# Patient Record
Sex: MALE | Race: WHITE | HISPANIC OR LATINO | Employment: FULL TIME | URBAN - METROPOLITAN AREA
[De-identification: names, ages, dates, MRNs, and addresses within clinical notes are randomized per-mention and may not be internally consistent; named-entity substitution may affect disease eponyms.]

---

## 2018-05-04 ENCOUNTER — OFFICE VISIT (OUTPATIENT)
Dept: OCCUPATIONAL MEDICINE | Facility: CLINIC | Age: 33
End: 2018-05-04
Payer: OTHER MISCELLANEOUS

## 2018-05-04 VITALS
WEIGHT: 162 LBS | OXYGEN SATURATION: 98 % | SYSTOLIC BLOOD PRESSURE: 144 MMHG | RESPIRATION RATE: 16 BRPM | HEIGHT: 64 IN | TEMPERATURE: 97 F | DIASTOLIC BLOOD PRESSURE: 85 MMHG | HEART RATE: 70 BPM | BODY MASS INDEX: 27.66 KG/M2

## 2018-05-04 DIAGNOSIS — S61.012A LACERATION OF SKIN OF LEFT THUMB, INITIAL ENCOUNTER: Primary | ICD-10-CM

## 2018-05-04 DIAGNOSIS — R52 PAIN: ICD-10-CM

## 2018-05-04 PROCEDURE — 96372 THER/PROPH/DIAG INJ SC/IM: CPT | Mod: 59,S$GLB,, | Performed by: INTERNAL MEDICINE

## 2018-05-04 PROCEDURE — 90714 TD VACC NO PRESV 7 YRS+ IM: CPT | Mod: S$GLB,,, | Performed by: INTERNAL MEDICINE

## 2018-05-04 PROCEDURE — 90471 IMMUNIZATION ADMIN: CPT | Mod: S$GLB,,, | Performed by: INTERNAL MEDICINE

## 2018-05-04 PROCEDURE — 99213 OFFICE O/P EST LOW 20 MIN: CPT | Mod: 25,S$GLB,, | Performed by: INTERNAL MEDICINE

## 2018-05-04 RX ORDER — MUPIROCIN 20 MG/G
OINTMENT TOPICAL 3 TIMES DAILY
Qty: 15 G | Refills: 0 | Status: SHIPPED | OUTPATIENT
Start: 2018-05-04

## 2018-05-04 RX ORDER — ETODOLAC 400 MG/1
400 TABLET, FILM COATED ORAL 3 TIMES DAILY
Qty: 15 TABLET | Refills: 0 | Status: SHIPPED | OUTPATIENT
Start: 2018-05-04 | End: 2018-05-17 | Stop reason: SDUPTHER

## 2018-05-04 RX ORDER — KETOROLAC TROMETHAMINE 30 MG/ML
30 INJECTION, SOLUTION INTRAMUSCULAR; INTRAVENOUS
Status: COMPLETED | OUTPATIENT
Start: 2018-05-04 | End: 2018-05-04

## 2018-05-04 RX ORDER — KETOROLAC TROMETHAMINE 30 MG/ML
30 INJECTION, SOLUTION INTRAMUSCULAR; INTRAVENOUS ONCE
Qty: 1 ML | Refills: 0
Start: 2018-05-04 | End: 2018-05-04 | Stop reason: CLARIF

## 2018-05-04 RX ADMIN — KETOROLAC TROMETHAMINE 30 MG: 30 INJECTION, SOLUTION INTRAMUSCULAR; INTRAVENOUS at 11:05

## 2018-05-04 NOTE — PATIENT INSTRUCTIONS
Small or Superficial Laceration: Not Sutured  A laceration is a cut through the skin. A laceration requires stitches or staples if it is deep or spread open. A small laceration often doesn't require stitches.   You may need a tetanus shot. This may be given if you have no record of this vaccination and the object that caused the cut may lead to tetanus  Home care  · Your healthcare provider may prescribe an antibiotic. This is to help prevent infection. Follow all instructions for taking this medicine. Take the medicine every day until it is gone or you are told to stop. You should not have any left over.  · The healthcare provider may prescribe medicines for pain. Follow instructions for taking them.  · Follow the healthcare providers instructions on how to care for the cut.  · Wash your hands with soap and warm water before and after caring for cut. This helps prevent infection.  · Keep the wound clean and dry. If a bandage was applied and it becomes wet or dirty, replace it. Otherwise, leave it in place for the first 24 hours, then change it once a day or as directed.  · Clean the wound daily:  ¨ After removing any bandage, wash the area with soap and water. Use a wet cotton swab to loosen and remove any blood or crust that forms.  ¨ After cleaning, keep the wound clean and dry. Talk with your doctor before applying any antibiotic ointment to the wound. Reapply a fresh bandage.  · You may remove the bandage to shower as usual after the first 24 hours, but do not soak the area in water (no tub baths or swimming) for the next 5 days.  · If the area gets wet, gently pat it dry with a clean cloth. Replace the wet bandage with a dry one.  · Avoid activities that may reinjure your wound.  · Do not scratch, rub, or pick at the area.  · Check the wound daily for signs of infection listed below.  Follow-up care  Follow up with your healthcare provider as advised.  When to seek medical advice  Call your healthcare  provider right away if any of these occur:  · Wound bleeding not controlled by direct pressure  · Signs of infection, including increasing pain in the wound, increasing wound redness or swelling, or pus or bad odor coming from the wound  · Fever of 100.4°F (38ºC) or higher or as directed by your healthcare provider  · Stitches or staples come apart or fall out or surgical tape falls off before 7 days  · Wound edges re-open  · Wound changes colors  · Numbness around the wound   · Decreased movement around the injured area  Date Last Reviewed: 6/14/2015  © 6634-4837 [employer]. [address] [place], [place] [id number]. All rights reserved. This information is not intended as a substitute for professional medical care. Always follow your healthcare professional's instructions.

## 2018-05-04 NOTE — PROGRESS NOTES
"Subjective:       Patient ID: Jairo Willis is a 33 y.o. male.    Vitals:  height is 5' 4" (1.626 m) and weight is 73.5 kg (162 lb). His respiration is 16.     Chief Complaint: Laceration    Pt in for left thumb lac. Was cutting sweet potatoes and cut his finger. Happened about an hour ago. Pt tetanus shot is not utd.       Laceration    The incident occurred 1 to 3 hours ago. Possible foreign bodies include metal.   Cut tip of left thumb with knife.  Not a puncture.  Does not recall last tetanus shot.    Review of Systems   Constitution: Negative for chills and fever.   HENT: Negative for sore throat.    Eyes: Negative for blurred vision.   Cardiovascular: Negative for chest pain.   Respiratory: Negative for shortness of breath.    Skin: Negative for rash.   Musculoskeletal: Negative for back pain and joint pain.   Gastrointestinal: Negative for abdominal pain, diarrhea, nausea and vomiting.   Neurological: Negative for headaches.   Psychiatric/Behavioral: The patient is not nervous/anxious.        Objective:      Physical Exam   Constitutional: He appears well-developed and well-nourished.   Skin:   Superficial laceration above distal to nail.  Cleansed with Betadine soak.    Nursing note and vitals reviewed.      Assessment:       1.  Skin laceration left thumb  Plan:         1.  Cleanse with Betadine and apply sterile dressing.  2.  Soap and water cleansing and Bactroban Dressings until healed  3.  Td  "

## 2018-05-09 ENCOUNTER — OFFICE VISIT (OUTPATIENT)
Dept: OCCUPATIONAL MEDICINE | Facility: CLINIC | Age: 33
End: 2018-05-09
Payer: OTHER MISCELLANEOUS

## 2018-05-09 VITALS
RESPIRATION RATE: 16 BRPM | HEIGHT: 63 IN | BODY MASS INDEX: 29.06 KG/M2 | SYSTOLIC BLOOD PRESSURE: 130 MMHG | WEIGHT: 164 LBS | DIASTOLIC BLOOD PRESSURE: 87 MMHG | TEMPERATURE: 98 F | OXYGEN SATURATION: 97 % | HEART RATE: 98 BPM

## 2018-05-09 DIAGNOSIS — S61.219A LACERATION OF FINGER WITH INFECTION, INITIAL ENCOUNTER: Primary | ICD-10-CM

## 2018-05-09 DIAGNOSIS — L08.9 LACERATION OF FINGER WITH INFECTION, INITIAL ENCOUNTER: Primary | ICD-10-CM

## 2018-05-09 PROCEDURE — 99214 OFFICE O/P EST MOD 30 MIN: CPT | Mod: S$GLB,,, | Performed by: NURSE PRACTITIONER

## 2018-05-09 RX ORDER — CEPHALEXIN 250 MG/1
250 CAPSULE ORAL 4 TIMES DAILY
Qty: 40 CAPSULE | Refills: 0 | Status: SHIPPED | OUTPATIENT
Start: 2018-05-09 | End: 2018-05-19

## 2018-05-09 NOTE — LETTER
May 9, 2018      [facility]  [address] [address]  Phone: [phone]  Fax: [phone]       Patient: Jairo Willis   YOB: 1985  Date of Visit: 05/09/2018    To Whom It May Concern:    Choco Willis  was at [facility] on 05/09/2018. He may return to work/school on 5/9/18 with restrictions.   He will need to follow up with Occupational Medicine in the morning for further direction regarding his condition.   If you have any questions or concerns, or if I can be of further assistance, please do not hesitate to contact me.    Sincerely,    Amy Johnson NP

## 2018-05-09 NOTE — PATIENT INSTRUCTIONS
Laceration Repair   If your condition worsens or fails to improve we recommend that you receive another evaluation at the ER immediately or contact your PCP to discuss your concerns or return here. You must understand that you've received an urgent care treatment only and that you may be released before all your medical problems are known or treated. You the patient will arrange for followup care as instructed.   Use the prescription antibiotic ointment for 2-3 days only. Clean the wound twice a day with betadiene and apply the ointment. After that, only use a dry bandage as the ointment will keep the laceration too moist.   Monitor for signs of infection such as redness, drainage, increase swelling or increase pain.   If you were given narcotics for pain do not drive or operate heavy equipment or machinery.   Tylenol or ibuprofen can also be used as directed for pain unless you have an allergy to them or medical condition such as stomach ulcers, kidney or liver disease or blood thinners etc for which you should not be taking these type of medications.     Wound Check, Infection  You have a wound that has become infected. The wound will not heal properly unless the infection is cleared. Infection in a wound may also spread if it is not treated. In most cases, antibiotic medicines are prescribed to treat a wound infection.   Symptoms of a wound infection include:  · Redness or swelling around the wound  · Warmth coming from the wound  · New or worsening pain  · Red streaks around the wound  · Draining pus  · Fever  Home care  Follow all directions you are given to treat the infection.  Medicines  Take all medicines as prescribed.   · If you were given antibiotics, take them until they are gone or your healthcare provider tells you to stop. It is vital to finish the antibiotics even if you feel better. If you do not finish them, the infection may come back and be  harder to treat.  · If your infection is not responding to the medicines you are taking, you may be prescribed new medicines.  · Take medicine for pain as directed by your healthcare provider.  Wound care  Care for your wound as directed by your healthcare provider.  · Apply a warm compress (clean cloth soaked in hot water) to the infected area for about 5 to 10 minutes at a time. Be very careful not to burn yourself. Test the cloth on a non-infected area to make sure it is not too hot.  · Continue to change the dressing daily. If it becomes wet, stained with wound fluid, or dirty, change it sooner. To change it:  ¨ Wash your hands with soap and water before changing the dressing.  ¨ Carefully remove the dressing and tape. If it sticks to the wound, you may need to wet it a little to remove it. (Do not do this if your healthcare provider has told you not to.)  ¨ Gently clean the wound with clean water (or saline) using gauze, a clean washcloth, or cotton swab.  ¨ Do not use soap, alcohol, peroxide or other cleansers.  ¨ If you were told to dry the wound before putting on a new dressing, gently pat. Do not rub.  ¨ Throw out the old dressing.  ¨ Wash your hands again before opening the new, clean dressing.  ¨ Wash your hands again when you are done.  Follow-up care  Follow up with your healthcare provider as advised. If a culture was done, you will be notified if your treatment needs to change. Call as directed for the results.  When to seek medical advice  Call your health care provider right away if any of these occur:  · Symptoms of infection don't start to improve within 2 days of starting antibiotics  · Symptoms of infection get worse  · New symptoms, such as red streaks around the wound  · Fever of 100.4°F (38.0°C) or higher for more than 2 days after starting the antibiotics  Date Last Reviewed: 8/10/2015  © 3170-1366 [employer]. [address] [place] [address], [place] [address]. All rights reserved.  This information is not intended as a substitute for professional medical care. Always follow your healthcare professional's instructions.        Laceration: Infected Repair  A laceration is a cut through the skin. This has been closed (repaired) with sutures (stitches). However, the cut has become infected. Factors that make infection more likely include:  · The wound was deep or a puncture wound. These are more likely to get infected than shallow or wide-open wounds that are easier to clean  · Dirt or particles were deep into the wound at the time of injury.  · You have diabetes, HIV infection, or other problem that affects the immune system.  · Home care that is not followed carefully.  Treatment of an infected repair may require removal of some or all of the stitches. Oral antibiotic medicine may be prescribed to treat the infection.  Home care  · If antibiotics have been prescribed, take them exactly as directed. Do not stop taking them until they are gone or you are told to stop, even if you feel better.   · Follow the healthcare providers instructions on how to care for the cut.  · Unless otherwise instructed, change the bandage twice a day for the first few days, until the drainage stops. Then change it once a day. Change the bandage if it becomes wet, stained with wound fluid, or dirty.  · Clean the wound daily:  · After removing the bandage, gently wash the area with soap and water. Use a wet cotton swab to loosen and remove any blood or crust that forms.  · After cleaning, apply a thin layer of over-the-counter antibiotic ointment if advised. Reapply a fresh bandage.  · Follow the healthcare provider's instructions for keeping the wound dry. You may be given restrictions on showering or tub baths.  · If the bandage gets wet, remove it. Gently pat the wound dry with a clean cloth, then replace the wet bandage with a dry one.  · Do not scratch, rub, or pick at the area.  · Wash your hands with soap and  warm water before and after cleaning the wound or changing the bandage.  Follow-up care  Follow up with your healthcare provider as advised. It is important to follow up to ensure that the infection is improving.  When to seek medical advice  Call your healthcare provider right away if any of these occur:  · Symptoms don't begin to improve  · Increasing wound pain, redness, or swelling  · Red streaks coming from the wound  · Increase in pus coming from the wound  · Fever of 100.4°F (38°C) or higher, or as directed by your healthcare provider  Date Last Reviewed: 6/10/2015  © 7412-0587 [employer]. [address] [place] [address], [place] [address]. All rights reserved. This information is not intended as a substitute for professional medical care. Always follow your healthcare professional's instructions.

## 2018-05-09 NOTE — PROGRESS NOTES
"Subjective:       Patient ID: Jairo Willis is a 33 y.o. male.    Vitals:  height is 5' 3" (1.6 m) and weight is 74.4 kg (164 lb). His oral temperature is 97.8 °F (36.6 °C). His blood pressure is 130/87 and his pulse is 98. His respiration is 16 and oxygen saturation is 97%.     Chief Complaint: Wound Check    Pt is local  at [employer] [facility] [employer] hot, workers comp injury last week. Pt Cut left thumb finger last week with a knife while using it on ground meat, wound became open and painful now, has been draining clear liquid, pt was put on an abx cream and has been using it, pt concerned because he has had to miss work lately (3days) due to injury. Laceration was an avulsion on the tip of the left thumb, sutures were not placed. States he felt like he had a fever yesterday but didn't take it with a thermometer./      Wound Check   He was originally treated 10 to 14 days ago. Previous treatment included laceration repair. His temperature was unmeasured prior to arrival. There has been clear discharge from the wound. The redness has not changed. There is new swelling present. The pain has worsened. There is difficulty moving the extremity or digit due to pain.     Review of Systems   Constitution: Negative for weakness and malaise/fatigue.   HENT: Negative for nosebleeds.    Cardiovascular: Negative for chest pain and syncope.   Respiratory: Negative for shortness of breath.    Skin: Positive for poor wound healing.   Musculoskeletal: Positive for stiffness. Negative for back pain, joint pain and neck pain.   Gastrointestinal: Negative for abdominal pain.   Genitourinary: Negative for hematuria.   Neurological: Negative for dizziness and numbness.       Objective:      Physical Exam   Constitutional: He is oriented to person, place, and time. Vital signs are normal. He appears well-developed and well-nourished.   HENT:   Head: Normocephalic and atraumatic. Head is without abrasion, without contusion and without laceration. "   Right Ear: External ear normal.   Left Ear: External ear normal.   Nose: Nose normal.   Mouth/Throat: Oropharynx is clear and moist.   Eyes: Conjunctivae, EOM and lids are normal. Pupils are equal, round, and reactive to light.   Neck: Trachea normal, normal range of motion, full passive range of motion without pain and phonation normal. Neck supple.   Cardiovascular: Normal rate, regular rhythm and normal heart sounds.    Pulmonary/Chest: Effort normal and breath sounds normal. No stridor. No respiratory distress.   Musculoskeletal: Normal range of motion.   Neurological: He is alert and oriented to person, place, and time.   Skin: Skin is warm and dry. Capillary refill takes less than 2 seconds. Laceration noted. No abrasion, no bruising, no burn, no ecchymosis, no lesion and no rash noted. There is erythema.   Left thumb laceration with swelling, redness and tenderness (see picture); no active drainage noted.   Psychiatric: He has a normal mood and affect. His speech is normal and behavior is normal. Judgment and thought content normal. Cognition and memory are normal.   Nursing note and vitals reviewed.                Assessment:       1. Laceration of finger with infection, initial encounter        Plan:         Laceration of finger with infection, initial encounter  -     cephALEXin (KEFLEX) 250 MG capsule; Take 1 capsule (250 mg total) by mouth 4 (four) times daily.  Dispense: 40 capsule; Refill: 0      Patient Instructions                                                           Laceration Repair   If your condition worsens or fails to improve we recommend that you receive another evaluation at the ER immediately or contact your PCP to discuss your concerns or return here. You must understand that you've received an urgent care treatment only and that you may be released before all your medical problems are known or treated. You the patient will arrange for followup care as instructed.   Use the  prescription antibiotic ointment for 2-3 days only. Clean the wound twice a day with betadiene and apply the ointment. After that, only use a dry bandage as the ointment will keep the laceration too moist.   Monitor for signs of infection such as redness, drainage, increase swelling or increase pain.   If you were given narcotics for pain do not drive or operate heavy equipment or machinery.   Tylenol or ibuprofen can also be used as directed for pain unless you have an allergy to them or medical condition such as stomach ulcers, kidney or liver disease or blood thinners etc for which you should not be taking these type of medications.

## 2018-05-09 NOTE — LETTER
May 9, 2018      [facility]  [address] [address]  Phone: [phone]  Fax: [phone]       Patient: Jairo Willis   YOB: 1985  Date of Visit: 05/09/2018    To Whom It May Concern:    Choco Willis  was at [facility] on 05/09/2018. He may return to work/school on 5/11/18.  He will need to follow up with Occupational Medicine for further direction regarding any restrictions.   If you have any questions or concerns, or if I can be of further assistance, please do not hesitate to contact me.    Sincerely,    Amy Johnson NP

## 2018-05-09 NOTE — PROGRESS NOTES
Contacted the  Line and spoke with  Vaibhav (representative) initially then had to disconnect the line.  Contacted  line again and spoke with Ishaan (representative Ishaan #[id number]).  Patient presents for a follow up on a non-healing laceration.  Discussed diagnosis, treatment plan and follow-up with patient.  Patient verbalized understanding and agrees with plan of care.   line disconnected after visit complete.

## 2018-05-10 ENCOUNTER — OFFICE VISIT (OUTPATIENT)
Dept: URGENT CARE | Facility: CLINIC | Age: 33
End: 2018-05-10
Payer: OTHER MISCELLANEOUS

## 2018-05-10 DIAGNOSIS — S61.012D: Primary | ICD-10-CM

## 2018-05-10 PROCEDURE — 99213 OFFICE O/P EST LOW 20 MIN: CPT | Mod: S$GLB,,, | Performed by: PHYSICIAN ASSISTANT

## 2018-05-10 NOTE — PROGRESS NOTES
Subjective:       Patient ID: Jairo Willis is a 33 y.o. male.    Chief Complaint: Laceration (left thumb)    New work injury:  pt is a  at  [employer] on 5/4/18, he cut left thumb with a knife. Pt initially treated at urgent care. He continue to have complaints of pain to touch. He's taking keflex, lodine, and topical bactroban. Tetanus is UTD. ajd   Pt is RHD. MB      Laceration    The incident occurred 5 to 7 days ago. The laceration is located on the left hand. The laceration mechanism was a dirty knife. The pain is at a severity of 6/10. The pain has been intermittent since onset. He reports no foreign bodies present. His tetanus status is UTD.     Review of Systems   Constitution: Negative for chills, fever and weakness.   HENT: Negative for congestion, ear pain, nosebleeds and tinnitus.    Eyes: Negative for blurred vision and pain.   Cardiovascular: Negative for chest pain and palpitations.   Respiratory: Negative for cough, shortness of breath and wheezing.    Hematologic/Lymphatic: Does not bruise/bleed easily.   Skin: Positive for poor wound healing. Negative for dry skin, itching, rash and skin cancer.   Musculoskeletal: Negative for back pain, joint pain, muscle weakness, neck pain and stiffness.   Gastrointestinal: Negative for abdominal pain, constipation, diarrhea, nausea and vomiting.   Genitourinary: Negative for dysuria and hematuria.   Neurological: Negative for dizziness, headaches, numbness and seizures.   Allergic/Immunologic: Negative for hives.   All other systems reviewed and are negative.      Objective:      Physical Exam   Constitutional: He is oriented to person, place, and time. He appears well-developed and well-nourished.   HENT:   Head: Normocephalic and atraumatic. Head is without abrasion, without contusion and without laceration.   Right Ear: External ear normal.   Left Ear: External ear normal.   Nose: Nose normal.   Mouth/Throat: Oropharynx is clear and moist.   Eyes:  Conjunctivae, EOM and lids are normal. Pupils are equal, round, and reactive to light.   Neck: Trachea normal, full passive range of motion without pain and phonation normal. Neck supple.   Cardiovascular: Normal rate, regular rhythm and normal heart sounds.    Pulmonary/Chest: Effort normal and breath sounds normal. No stridor. No respiratory distress.   Musculoskeletal: Normal range of motion.        Left hand: He exhibits tenderness and laceration. He exhibits normal range of motion. Normal sensation noted.        Hands:  Neurological: He is alert and oriented to person, place, and time.   Skin: Skin is warm and dry. Capillary refill takes less than 2 seconds. Laceration noted. No abrasion, no bruising, no burn, no ecchymosis, no lesion and no rash noted. No erythema.   Psychiatric: He has a normal mood and affect. His speech is normal and behavior is normal. Judgment and thought content normal. Cognition and memory are normal.   Nursing note reviewed.      Assessment:       1. Laceration of skin of left thumb, subsequent encounter        Plan:            Patient Instructions: Keep dressing clean/dry/covered, Do not apply ointments or creams unless directed (Continue Keflex and lodine as directed. 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 )  Follow-up in about 1 week (around 5/17/2018).

## 2018-05-10 NOTE — LETTER
[facility]  [address], [address]  [facility] [address]  Phone: [phone]  Fax: [phone]    Pt Name: Jairo Willis  Injury Date: 05/04/2018   Employee ID: [id number] Date of First Treatment: 05/10/2018   Company: [employer]             Appointment Time:  Arrived:  2:15 PM CDT   Physician: Glen Bertrand PA-C Time out: 3:02 PM       Office Treatment: Jairo was seen today for laceration.    Diagnoses and all orders for this visit:    Laceration of skin of left thumb, subsequent encounter       Patient Instructions: Keep dressing clean/dry/covered, Do not apply ointments or creams unless directed (Continue Keflex and lodine as directed. )      Restrictions: (Must wear glove when handling food. )  Regular Duty       Return Appointment: 5/17/2018 at 2:00 PM

## 2018-05-17 ENCOUNTER — OFFICE VISIT (OUTPATIENT)
Dept: URGENT CARE | Facility: CLINIC | Age: 33
End: 2018-05-17
Payer: OTHER MISCELLANEOUS

## 2018-05-17 DIAGNOSIS — S61.012D LACERATION OF LEFT THUMB WITHOUT FOREIGN BODY WITHOUT DAMAGE TO NAIL, SUBSEQUENT ENCOUNTER: Primary | ICD-10-CM

## 2018-05-17 PROCEDURE — 99213 OFFICE O/P EST LOW 20 MIN: CPT | Mod: S$GLB,,, | Performed by: PHYSICIAN ASSISTANT

## 2018-05-17 RX ORDER — ETODOLAC 400 MG/1
400 TABLET, FILM COATED ORAL 3 TIMES DAILY
Qty: 15 TABLET | Refills: 0 | Status: SHIPPED | OUTPATIENT
Start: 2018-05-17 | End: 2018-05-17 | Stop reason: SDUPTHER

## 2018-05-17 RX ORDER — ETODOLAC 400 MG/1
400 TABLET, FILM COATED ORAL 3 TIMES DAILY
Qty: 15 TABLET | Refills: 0 | Status: SHIPPED | OUTPATIENT
Start: 2018-05-17 | End: 2018-05-17 | Stop reason: ALTCHOICE

## 2018-05-17 NOTE — PROGRESS NOTES
Subjective:       Patient ID: Jairo Willis is a 33 y.o. male.    Chief Complaint: Laceration (left thumb)    F/u for left thumb laceration (5/4/18) Pt reports pain when bumped, and random bleeding.       Laceration    The incident occurred more than 1 week ago. The laceration is located on the left hand. The laceration mechanism was a dirty knife. The pain is mild. The pain has been intermittent since onset. He reports no foreign bodies present. His tetanus status is UTD.     Review of Systems   Constitution: Negative for chills, fever and weakness.   HENT: Negative for congestion, ear pain, nosebleeds and tinnitus.    Eyes: Negative for blurred vision and pain.   Cardiovascular: Negative for chest pain and palpitations.   Respiratory: Negative for cough, shortness of breath and wheezing.    Hematologic/Lymphatic: Does not bruise/bleed easily.   Skin: Positive for color change and poor wound healing. Negative for dry skin, itching, rash and skin cancer.   Musculoskeletal: Negative for back pain, joint pain, muscle weakness, neck pain and stiffness.   Gastrointestinal: Negative for abdominal pain, constipation, diarrhea, nausea and vomiting.   Genitourinary: Negative for dysuria and hematuria.   Neurological: Negative for dizziness, headaches, numbness and seizures.   Allergic/Immunologic: Negative for hives.   All other systems reviewed and are negative.      Objective:      Physical Exam   Constitutional: He is oriented to person, place, and time. He appears well-developed and well-nourished.   HENT:   Head: Normocephalic and atraumatic. Head is without abrasion, without contusion and without laceration.   Right Ear: External ear normal.   Left Ear: External ear normal.   Nose: Nose normal.   Mouth/Throat: Oropharynx is clear and moist.   Eyes: Conjunctivae, EOM and lids are normal. Pupils are equal, round, and reactive to light.   Neck: Trachea normal, full passive range of motion without pain and phonation normal.  Neck supple.   Cardiovascular: Normal rate, regular rhythm and normal heart sounds.    Pulmonary/Chest: Effort normal and breath sounds normal. No stridor. No respiratory distress.   Musculoskeletal: Normal range of motion.        Left hand: He exhibits tenderness and laceration. He exhibits normal range of motion, no bony tenderness, normal capillary refill, no deformity and no swelling. Normal sensation noted.   Neurological: He is alert and oriented to person, place, and time.   Skin: Skin is warm and dry. Capillary refill takes less than 2 seconds. Laceration noted. No abrasion, no bruising, no burn, no ecchymosis, no lesion and no rash noted. No erythema.   Psychiatric: He has a normal mood and affect. His speech is normal and behavior is normal. Judgment and thought content normal. Cognition and memory are normal.   Nursing note reviewed.                Assessment:       1. Laceration of left thumb without foreign body without damage to nail, subsequent encounter        Plan:       Delayed healing due to tissue protruding from wound. I will refer Dr. Jo as pt likely needs tissue debridement for proper healing.        Patient Instructions: Keep dressing clean/dry/covered, Do not apply ointments or creams unless directed (Continue Keflex until complete)   Restrictions: Regular Duty (Must wear gloves when handling food.)  Follow-up in about 6 days (around 5/23/2018).

## 2018-05-17 NOTE — LETTER
[facility] [place]  [address], [address]  [place] [address]  Phone: [phone]  Fax: [phone]    Pt Name: Jairo Willis  Injury Date: 05/04/2018   Employee ID: [id number] Date 05/17/2018   Company: [employer]             Appointment Time: 2:00 PM Arrived:  2:00 PM CDT   Physician: Glen Bertrand PA-C Time out: 2:52 PM       Office Treatment: Jairo was seen today for laceration.    Diagnoses and all orders for this visit:    Laceration of left thumb without foreign body without damage to nail, subsequent encounter  Other orders       Patient Instructions: Keep dressing clean/dry/covered, Do not apply ointments or creams unless directed (Continue Keflex until complete)    Restrictions:(Must wear gloves when handling food.)   Regular Duty        Return Appointment: 5/23/2018 at 1:00 PM

## 2018-05-24 ENCOUNTER — TELEPHONE (OUTPATIENT)
Dept: URGENT CARE | Facility: CLINIC | Age: 33
End: 2018-05-24

## 2018-05-30 ENCOUNTER — OFFICE VISIT (OUTPATIENT)
Dept: URGENT CARE | Facility: CLINIC | Age: 33
End: 2018-05-30
Payer: OTHER MISCELLANEOUS

## 2018-05-30 DIAGNOSIS — S61.012D: ICD-10-CM

## 2018-05-30 DIAGNOSIS — S61.012D LACERATION OF LEFT THUMB WITHOUT FOREIGN BODY WITHOUT DAMAGE TO NAIL, SUBSEQUENT ENCOUNTER: Primary | ICD-10-CM

## 2018-05-30 PROCEDURE — 99213 OFFICE O/P EST LOW 20 MIN: CPT | Mod: S$GLB,,, | Performed by: PREVENTIVE MEDICINE

## 2018-05-30 NOTE — LETTER
[facility] [place]  [address], [address]  [place] [facility] [place] [facility] [place] [address]  Phone: [phone]  Fax: [phone]    Pt Name: Jairo Willis  Injury Date: 05/04/2018   Employee ID: [id number] Date: 05/30/2018   Company:[employer]             Appointment Time: 2:00 PM Arrived:  2:00 PM CDT   Physician: José Miguel Jo MD Time out: 2:39 PM       Office Treatment: Jairo was seen today for laceration.    Diagnoses and all orders for this visit:    Laceration of left thumb without foreign body without damage to nail, subsequent encounter    Laceration of skin of left thumb, subsequent encounter       Patient Instructions: Keep dressing clean/dry/covered      Restrictions: None  Regular Duty,   Discharged from Occupational Health

## 2018-05-30 NOTE — PROGRESS NOTES
"Subjective:       Patient ID: Jairo Willis is a 33 y.o. male.    Chief Complaint: Laceration (left thumb)    F/u for left thumb laceration (5/4/18) Pt states his finger is "good"      Laceration    The incident occurred more than 1 week ago. The laceration is located on the left hand. The laceration mechanism was a dirty knife. The patient is experiencing no pain. He reports no foreign bodies present. His tetanus status is UTD.     Review of Systems   Constitution: Negative for chills, fever and weakness.   HENT: Negative for congestion, ear pain, nosebleeds and tinnitus.    Eyes: Negative for blurred vision and pain.   Cardiovascular: Negative for chest pain and palpitations.   Respiratory: Negative for cough, shortness of breath and wheezing.    Hematologic/Lymphatic: Does not bruise/bleed easily.   Skin: Positive for dry skin. Negative for itching, poor wound healing, rash and skin cancer.   Musculoskeletal: Negative for back pain, joint pain, muscle weakness, neck pain and stiffness.   Gastrointestinal: Negative for abdominal pain, constipation, diarrhea, nausea and vomiting.   Genitourinary: Negative for dysuria and hematuria.   Neurological: Negative for dizziness, headaches, numbness and seizures.   Allergic/Immunologic: Negative for hives.   All other systems reviewed and are negative.      Objective:      Physical Exam   Constitutional: He is oriented to person, place, and time. He appears well-developed and well-nourished.   HENT:   Head: Normocephalic and atraumatic. Head is without abrasion, without contusion and without laceration.   Right Ear: External ear normal.   Left Ear: External ear normal.   Nose: Nose normal.   Mouth/Throat: Oropharynx is clear and moist.   Eyes: Conjunctivae, EOM and lids are normal. Pupils are equal, round, and reactive to light.   Neck: Trachea normal, full passive range of motion without pain and phonation normal. Neck supple.   Cardiovascular: Normal rate, regular rhythm and " normal heart sounds.    Pulmonary/Chest: Effort normal and breath sounds normal. No stridor. No respiratory distress.   Musculoskeletal: Normal range of motion.        Left hand: He exhibits tenderness and laceration. He exhibits normal range of motion, no bony tenderness, normal capillary refill, no deformity and no swelling. Normal sensation noted.   Neurological: He is alert and oriented to person, place, and time.   Skin: Skin is warm and dry. Capillary refill takes less than 2 seconds. Laceration noted. No abrasion, no bruising, no burn, no ecchymosis, no lesion and no rash noted. No erythema.        Healing laceration tip of left thumb. No infection noted.   Psychiatric: He has a normal mood and affect. His speech is normal and behavior is normal. Judgment and thought content normal. Cognition and memory are normal.   Nursing note reviewed.      Assessment:       1. Laceration of left thumb without foreign body without damage to nail, subsequent encounter    2. Laceration of skin of left thumb, subsequent encounter        Plan:            Patient Instructions: Keep dressing clean/dry/covered   Restrictions: Regular Duty, Discharged from Occupational Health  Follow-up if symptoms worsen or fail to improve.